# Patient Record
Sex: FEMALE | Race: WHITE | NOT HISPANIC OR LATINO
[De-identification: names, ages, dates, MRNs, and addresses within clinical notes are randomized per-mention and may not be internally consistent; named-entity substitution may affect disease eponyms.]

---

## 2018-12-24 ENCOUNTER — TRANSCRIPTION ENCOUNTER (OUTPATIENT)
Age: 73
End: 2018-12-24

## 2019-10-20 ENCOUNTER — TRANSCRIPTION ENCOUNTER (OUTPATIENT)
Age: 74
End: 2019-10-20

## 2023-04-05 ENCOUNTER — APPOINTMENT (OUTPATIENT)
Dept: ORTHOPEDIC SURGERY | Facility: CLINIC | Age: 78
End: 2023-04-05
Payer: MEDICARE

## 2023-04-05 VITALS — WEIGHT: 180 LBS | BODY MASS INDEX: 35.34 KG/M2 | HEIGHT: 60 IN

## 2023-04-05 DIAGNOSIS — Z78.9 OTHER SPECIFIED HEALTH STATUS: ICD-10-CM

## 2023-04-05 DIAGNOSIS — M54.16 RADICULOPATHY, LUMBAR REGION: ICD-10-CM

## 2023-04-05 DIAGNOSIS — M16.11 UNILATERAL PRIMARY OSTEOARTHRITIS, RIGHT HIP: ICD-10-CM

## 2023-04-05 DIAGNOSIS — Z86.39 PERSONAL HISTORY OF OTHER ENDOCRINE, NUTRITIONAL AND METABOLIC DISEASE: ICD-10-CM

## 2023-04-05 DIAGNOSIS — Z86.79 PERSONAL HISTORY OF OTHER DISEASES OF THE CIRCULATORY SYSTEM: ICD-10-CM

## 2023-04-05 DIAGNOSIS — M35.00 SICCA SYNDROME, UNSPECIFIED: ICD-10-CM

## 2023-04-05 DIAGNOSIS — Z87.19 PERSONAL HISTORY OF OTHER DISEASES OF THE DIGESTIVE SYSTEM: ICD-10-CM

## 2023-04-05 DIAGNOSIS — M19.90 UNSPECIFIED OSTEOARTHRITIS, UNSPECIFIED SITE: ICD-10-CM

## 2023-04-05 DIAGNOSIS — C80.1 MALIGNANT (PRIMARY) NEOPLASM, UNSPECIFIED: ICD-10-CM

## 2023-04-05 PROBLEM — Z00.00 ENCOUNTER FOR PREVENTIVE HEALTH EXAMINATION: Status: ACTIVE | Noted: 2023-04-05

## 2023-04-05 PROCEDURE — 73502 X-RAY EXAM HIP UNI 2-3 VIEWS: CPT

## 2023-04-05 PROCEDURE — 99205 OFFICE O/P NEW HI 60 MIN: CPT

## 2023-04-05 RX ORDER — EZETIMIBE AND SIMVASTATIN 10; 40 MG/1; MG/1
10-40 TABLET ORAL
Refills: 0 | Status: ACTIVE | COMMUNITY

## 2023-04-05 RX ORDER — FERROUS GLUCONATE 324(37.5)
324 (37.5 FE) TABLET ORAL
Refills: 0 | Status: ACTIVE | COMMUNITY

## 2023-04-05 RX ORDER — CALCIUM CITRATE/VITAMIN D3 315MG-6.25
250-62.5 TABLET ORAL
Refills: 0 | Status: ACTIVE | COMMUNITY

## 2023-04-05 RX ORDER — LOSARTAN POTASSIUM 100 MG/1
100 TABLET, FILM COATED ORAL
Refills: 0 | Status: ACTIVE | COMMUNITY

## 2023-04-05 RX ORDER — CHROMIUM 200 MCG
TABLET ORAL
Refills: 0 | Status: ACTIVE | COMMUNITY

## 2023-04-05 RX ORDER — LACTOBACILLUS RHAMNOSUS GG 10B CELL
CAPSULE ORAL
Refills: 0 | Status: ACTIVE | COMMUNITY

## 2023-04-05 RX ORDER — AMLODIPINE BESYLATE 5 MG/1
5 TABLET ORAL
Refills: 0 | Status: ACTIVE | COMMUNITY

## 2023-04-05 RX ORDER — MULTIVIT-MIN/FA/LYCOPEN/LUTEIN .4-300-25
TABLET ORAL
Refills: 0 | Status: ACTIVE | COMMUNITY

## 2023-04-05 RX ORDER — TRIAMTERENE AND HYDROCHLOROTHIAZIDE 25; 37.5 MG/1; MG/1
37.5-25 TABLET ORAL
Refills: 0 | Status: ACTIVE | COMMUNITY

## 2023-04-05 RX ORDER — LEVOTHYROXINE SODIUM 137 UG/1
TABLET ORAL
Refills: 0 | Status: ACTIVE | COMMUNITY

## 2023-04-05 RX ORDER — LEVOTHYROXINE SODIUM 0.17 MG/1
TABLET ORAL
Refills: 0 | Status: ACTIVE | COMMUNITY

## 2023-04-06 ENCOUNTER — RESULT REVIEW (OUTPATIENT)
Age: 78
End: 2023-04-06

## 2023-04-06 NOTE — HISTORY OF PRESENT ILLNESS
[de-identified] : Patient is here today for her Rt hip. States her Rt hip started hurting about 2 weeks ago. States she has groin pain. States she is taking Tylenol, Tizanidine, and prednisone. Patient notes pain mostly Right SI and buttock into the lateral hip and groin. Pain is almost constant. She saw her PMD who sent her for xrays of the L/S spine and given Prednisone which she took with some relief and has 2 days left and Muscle relaxant.  She denies any paresthesias or weakness to the RLE.  \par \par pt sees a rheumatologist for osteoporosis\par \par pt is unable to walk for more than one block\par \par we believe the pt had a thoracic fusion done in ~2004

## 2023-04-06 NOTE — DISCUSSION/SUMMARY
[de-identified] : Options were discussed today including oral anti-inflammatories, Physical Therapy, Steroid Injection, PM or MRI. The patient had time to ask questions of the different treatment options, including doing nothing and just observing for a finite period of time and re-evaluating in the future. \par Suspect pt's sxs are coming from her spine\par MAUREEN not recommended at this time \par Recommend MRI of lumbar spine. pt will f/u with Dr Rivera after MRI for results and further evaluation \par \par Entered by Elsie Coronado acting as scribe. \par Dr. Nelson Attestation\par The documentation recorded by the scribe, in my presence, accurately reflects the service I, Dr. Nelson, personally performed, and the decisions made by me with my edits as appropriate.\par

## 2023-04-06 NOTE — PHYSICAL EXAM
[Outside films reviewed] : Outside films reviewed [Facet arthropathy] : Facet arthropathy [Disc space narrowing] : Disc space narrowing [Spondylolithesis] : Spondylolithesis [Right] : right hip with pelvis [de-identified] : Constitutional: The patient appears well developed, well nourished. Examination of patients ability to communicate functionally was normal.  \par \par  \par \par Neurologic: Coordination is normal. Alert and oriented to time, place and person. No evidence of mood disorder, calm affect.  \par \par  \par \par RIGHT      HIP/THIGH: Inspection of the hip/thigh is as follows: Inspection shows no swelling, no ecchymosis, no erythema, no rashes, no masses and no enlarged lymph nodes.  \par \par  \par \par Palpation of the hip/thigh is as follows:MOST TTP at the Right SI joint/ Lumbar paraspinal musles on the RIGHT NO  greater trochanter tenderness. no groin tenderness.  \par \par NEG SLR TEST\par Q/H/AT/GS/EHL/FHL 5/5 \par Pat reflexes are 2+ b/l\par Ankle reflexes 1 + b/l \par \par  \par \par Range of motion of the hip is as follows in degrees:   \par \par  \par \par Flexion: 110  \par \par Abduction:  30  \par \par External rotation:  40  \par \par Internal rotation:  30   \par \par THERE IS NO SIGNIFICANT PAIN WITH ROM HIP\par  \par \par Strength testing of the hip/thigh is as follows:  \par \par  \par \par Hip flexion strength:  5/5,  \par \par Hip extension strength:  5/5  \par \par Hip abduction strength:   5/5   \par \par Hip adduction strength:   5/5.  \par \par  \par \par Special tests of the hip/thigh is as follows: pain in bursa with internal rotation and pain bursa with external rotation.  \par \par  \par \par Neurological testing of the hip/thigh is as follows: motor exam 5/5 throughout, light touch intact throughout and no focal motor defecits.  \par \par  \par \par Gait and function is as follows:antalgic gait. \par \par   [FreeTextEntry1] : L4-L5 spondylolisthesis grade 1 [FreeTextEntry9] : ap/lat show mild hip DJD and spurring at the greater troch and iliac wing

## 2023-04-20 ENCOUNTER — APPOINTMENT (OUTPATIENT)
Dept: ORTHOPEDIC SURGERY | Facility: CLINIC | Age: 78
End: 2023-04-20
Payer: MEDICARE

## 2023-04-20 VITALS — BODY MASS INDEX: 35.34 KG/M2 | WEIGHT: 180 LBS | HEIGHT: 60 IN

## 2023-04-20 PROCEDURE — 99214 OFFICE O/P EST MOD 30 MIN: CPT

## 2023-04-20 RX ORDER — METHYLPREDNISOLONE 4 MG/1
4 TABLET ORAL
Qty: 1 | Refills: 0 | Status: ACTIVE | COMMUNITY
Start: 2023-04-20 | End: 1900-01-01

## 2023-04-20 NOTE — DATA REVIEWED
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

## 2023-04-20 NOTE — ASSESSMENT
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  Patient advised not to take any NSAIDs while taking the steroids. \par \par Referral to pain management for injections, follow up 2 weeks after injection. \par \par Patient will begin physical therapy. \par \par Recommend: - NSAID - Heating pad - Muscle relaxer - Core strengthening exercise - Hamstring stretching exercise Patient is given back rehabilitation exercise book. \par \par Follow up in 2 months

## 2023-04-20 NOTE — HISTORY OF PRESENT ILLNESS
[Lower back] : lower back [5] : 5 [1] : 2 [Dull/Aching] : dull/aching [Stabbing] : stabbing [Meds] : meds [Retired] : Work status: retired [de-identified] : Patient presents today with lower back pain for a few weeks NKI . PReviously had rib removed and inserted into upper back for vertebrae in [place] with Dr. Eirc Lee ~20 years ago. Saw Dr. Nelson for the right hip, prescribed Acetaminophen-Codeine #3 and was sent for MRI. States she has right sided pain radiating into right hip, denies groin pain. Admits to taking extra strength Tylenol PRN. Had lumbar spine MRI at . [] : no [de-identified] : driving  [de-identified] : lumbar spine MRI at

## 2023-06-29 ENCOUNTER — APPOINTMENT (OUTPATIENT)
Dept: ORTHOPEDIC SURGERY | Facility: CLINIC | Age: 78
End: 2023-06-29
Payer: MEDICARE

## 2023-06-29 VITALS — WEIGHT: 180 LBS | HEIGHT: 60 IN | BODY MASS INDEX: 35.34 KG/M2

## 2023-06-29 DIAGNOSIS — M48.061 SPINAL STENOSIS, LUMBAR REGION WITHOUT NEUROGENIC CLAUDICATION: ICD-10-CM

## 2023-06-29 DIAGNOSIS — M51.37 OTHER INTERVERTEBRAL DISC DEGENERATION, LUMBOSACRAL REGION: ICD-10-CM

## 2023-06-29 DIAGNOSIS — M51.36 OTHER INTERVERTEBRAL DISC DEGENERATION, LUMBAR REGION: ICD-10-CM

## 2023-06-29 DIAGNOSIS — Q76.49 OTHER CONGENITAL MALFORMATIONS OF SPINE, NOT ASSOCIATED WITH SCOLIOSIS: ICD-10-CM

## 2023-06-29 DIAGNOSIS — M51.26 OTHER INTERVERTEBRAL DISC DISPLACEMENT, LUMBAR REGION: ICD-10-CM

## 2023-06-29 DIAGNOSIS — M47.817 SPONDYLOSIS W/OUT MYELOPATHY OR RADICULOPATHY, LUMBOSACRAL REGION: ICD-10-CM

## 2023-06-29 DIAGNOSIS — M54.16 RADICULOPATHY, LUMBAR REGION: ICD-10-CM

## 2023-06-29 DIAGNOSIS — M70.62 TROCHANTERIC BURSITIS, LEFT HIP: ICD-10-CM

## 2023-06-29 PROCEDURE — 99214 OFFICE O/P EST MOD 30 MIN: CPT

## 2023-06-29 NOTE — ASSESSMENT
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 \par \par Follow up PRN

## 2023-06-29 NOTE — HISTORY OF PRESENT ILLNESS
[Lower back] : lower back [5] : 5 [1] : 2 [Dull/Aching] : dull/aching [Stabbing] : stabbing [Meds] : meds [Retired] : Work status: retired [de-identified] : Follow up lumbar spine. States she has intermittent discomfort. Had a right lumbar facet injection with Dr. Parsons ~1month ago with 99% relief. Admits to going to 3 PT sessions and then D/C. Admits to finishing Medrol pack with some relief. Admits to taking extra strength Tylenol PRN.  [] : no [de-identified] : driving  [de-identified] : lumbar spine MRI at

## 2023-08-03 ENCOUNTER — NON-APPOINTMENT (OUTPATIENT)
Age: 78
End: 2023-08-03

## 2023-08-03 ENCOUNTER — APPOINTMENT (OUTPATIENT)
Dept: PULMONOLOGY | Facility: CLINIC | Age: 78
End: 2023-08-03
Payer: MEDICARE

## 2023-08-03 VITALS
WEIGHT: 190 LBS | TEMPERATURE: 96.7 F | HEART RATE: 70 BPM | BODY MASS INDEX: 37.3 KG/M2 | OXYGEN SATURATION: 98 % | HEIGHT: 60 IN | SYSTOLIC BLOOD PRESSURE: 152 MMHG | DIASTOLIC BLOOD PRESSURE: 64 MMHG

## 2023-08-03 DIAGNOSIS — Z86.39 PERSONAL HISTORY OF OTHER ENDOCRINE, NUTRITIONAL AND METABOLIC DISEASE: ICD-10-CM

## 2023-08-03 DIAGNOSIS — Z87.01 PERSONAL HISTORY OF PNEUMONIA (RECURRENT): ICD-10-CM

## 2023-08-03 DIAGNOSIS — Z91.09 OTHER ALLERGY STATUS, OTHER THAN TO DRUGS AND BIOLOGICAL SUBSTANCES: ICD-10-CM

## 2023-08-03 DIAGNOSIS — J30.2 OTHER SEASONAL ALLERGIC RHINITIS: ICD-10-CM

## 2023-08-03 DIAGNOSIS — U07.1 COVID-19: ICD-10-CM

## 2023-08-03 PROCEDURE — 99214 OFFICE O/P EST MOD 30 MIN: CPT

## 2023-08-03 NOTE — HISTORY OF PRESENT ILLNESS
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

## 2023-08-03 NOTE — ASSESSMENT
[FreeTextEntry1] : Ms. Gonzáles presents with chest tightness and shortness of breath.  She comes with an abnormal CAT scan of the chest.  She had a full cardiac work-up which was negative.  Her CAT scan of the chest done on July 11, 2023 reveals mild emphysema changes elevated left diaphragm patchy areas atelectasis right middle right lower lobe and slight scarring and bronchiectasis left base.  I reviewed the CAT scan myself and I think these are all very nonspecific.  She did have radiation to the right breast in 2002 but I do not think that the findings in the right lung are secondary to radiation.  The chest pain/tightness is very nonspecific and unlikely to be pulmonary in origin.  She has been given albuterol and I favor her using the albuterol with the chest tightness and her dyspnea.  A significant opponent of shortness of breath is probably secondary to her weight and she has been instructed weight loss is always beneficial.  We will perform full pulmonary function test on her next visit. The patient understands and agrees with plan of care. Today's office visit encompassed 32 minutes. I conducted an extensive history ,physical exam and reviewed diagnosis and treatment options  including diagnostic tests,radiologic studies including  cat scans  and the use of prescription medication.

## 2023-08-03 NOTE — REASON FOR VISIT
[Initial] : an initial visit [Pneumonia] : pneumonia [Shortness of Breath] : shortness of breath [TextBox_44] : Patient states she had Pneumonia on July 13th she was treated with Azithromycin 250 mg, prednisone 20 mg, Doxycycline 100 mg.  She finished the medication, and started to feel better.  Patient still has lingering SOB.  SOB has been going on for about a year.

## 2023-09-05 ENCOUNTER — APPOINTMENT (OUTPATIENT)
Dept: PULMONOLOGY | Facility: CLINIC | Age: 78
End: 2023-09-05
Payer: MEDICARE

## 2023-09-05 VITALS
TEMPERATURE: 96.9 F | BODY MASS INDEX: 37.3 KG/M2 | HEART RATE: 79 BPM | DIASTOLIC BLOOD PRESSURE: 76 MMHG | SYSTOLIC BLOOD PRESSURE: 148 MMHG | WEIGHT: 190 LBS | HEIGHT: 60 IN | OXYGEN SATURATION: 98 %

## 2023-09-05 DIAGNOSIS — R07.9 CHEST PAIN, UNSPECIFIED: ICD-10-CM

## 2023-09-05 PROCEDURE — 94010 BREATHING CAPACITY TEST: CPT

## 2023-09-05 PROCEDURE — 94727 GAS DIL/WSHOT DETER LNG VOL: CPT

## 2023-09-05 PROCEDURE — 94729 DIFFUSING CAPACITY: CPT

## 2023-09-05 PROCEDURE — 99214 OFFICE O/P EST MOD 30 MIN: CPT | Mod: 25

## 2023-09-05 RX ORDER — ACETAMINOPHEN AND CODEINE 300; 30 MG/1; MG/1
300-30 TABLET ORAL
Qty: 30 | Refills: 0 | Status: DISCONTINUED | COMMUNITY
Start: 2023-04-05 | End: 2023-09-05

## 2023-09-05 NOTE — PHYSICAL EXAM
[No Acute Distress] : no acute distress [Normal Oropharynx] : normal oropharynx [Normal Appearance] : normal appearance [No Neck Mass] : no neck mass [Normal Rate/Rhythm] : normal rate/rhythm [Normal S1, S2] : normal s1, s2 [No Murmurs] : no murmurs [No Resp Distress] : no resp distress [Clear to Auscultation Bilaterally] : clear to auscultation bilaterally [No Abnormalities] : no abnormalities [Benign] : benign [Normal Gait] : normal gait [No Clubbing] : no clubbing [No Cyanosis] : no cyanosis [No Edema] : no edema [FROM] : FROM [Normal Color/ Pigmentation] : normal color/ pigmentation [No Focal Deficits] : no focal deficits [Oriented x3] : oriented x3 [Normal Affect] : normal affect [TextBox_2] : Heavyset female no distress

## 2023-09-05 NOTE — REASON FOR VISIT
[Follow-Up] : a follow-up visit [Shortness of Breath] : shortness of breath [TextBox_44] : 1 month.  Patient states she is still experiencing SOB with exertion.  She is scheduled to have a PFT today.

## 2023-09-05 NOTE — DISCUSSION/SUMMARY
[FreeTextEntry1] : Ms berrios has  thorax pain mainly in the upper chest thorax sternal area with radiation to her jaw.  This is very unusual for a pulmonary process.  She does have an elevated left diaphragm and I have asked her to obtain fluoroscopy of the diaphragm to see if this contributes to any of her dyspnea.  She does have a large hiatal hernia and is possible that this is the cause of some of her discomfort.  I have asked her asked her to follow-up with gastroenterology regarding same.  We will review her fluoroscopy on her next visit. The patient understands and agrees with plan of care. Today's office visit encompassed 32 minutes. I conducted an extensive history ,physical exam and reviewed diagnosis and treatment options  including diagnostic tests,radiologic studies including  cat scans  and the use of prescription medication.

## 2023-09-05 NOTE — HISTORY OF PRESENT ILLNESS
[Never] : never [TextBox_4] : 78 female hx  dyspnea on exertiontoday feels good but  this past month notes tightness in chest upper fields ant and to jaw this happens every few days   no definite ppt factor  not related to exertion  no cough no phlegm no wheeze

## 2023-09-06 ENCOUNTER — NON-APPOINTMENT (OUTPATIENT)
Age: 78
End: 2023-09-06

## 2023-09-07 ENCOUNTER — NON-APPOINTMENT (OUTPATIENT)
Age: 78
End: 2023-09-07

## 2023-09-26 ENCOUNTER — APPOINTMENT (OUTPATIENT)
Dept: PULMONOLOGY | Facility: CLINIC | Age: 78
End: 2023-09-26

## 2023-12-13 ENCOUNTER — OFFICE (OUTPATIENT)
Facility: LOCATION | Age: 78
Setting detail: OPHTHALMOLOGY
End: 2023-12-13
Payer: MEDICARE

## 2023-12-13 ENCOUNTER — RX ONLY (RX ONLY)
Age: 78
End: 2023-12-13

## 2023-12-13 DIAGNOSIS — H43.393: ICD-10-CM

## 2023-12-13 DIAGNOSIS — H00.14: ICD-10-CM

## 2023-12-13 DIAGNOSIS — H01.002: ICD-10-CM

## 2023-12-13 DIAGNOSIS — H01.004: ICD-10-CM

## 2023-12-13 DIAGNOSIS — Z96.1: ICD-10-CM

## 2023-12-13 DIAGNOSIS — H01.001: ICD-10-CM

## 2023-12-13 DIAGNOSIS — H35.033: ICD-10-CM

## 2023-12-13 DIAGNOSIS — H16.223: ICD-10-CM

## 2023-12-13 DIAGNOSIS — H43.813: ICD-10-CM

## 2023-12-13 DIAGNOSIS — Z13.5: ICD-10-CM

## 2023-12-13 DIAGNOSIS — H26.492: ICD-10-CM

## 2023-12-13 DIAGNOSIS — H01.005: ICD-10-CM

## 2023-12-13 PROBLEM — H31.29 PERIPAPILLARY ATROPHY ; BOTH EYES: Status: ACTIVE | Noted: 2023-12-13

## 2023-12-13 PROCEDURE — 92014 COMPRE OPH EXAM EST PT 1/>: CPT | Performed by: OPTOMETRIST

## 2023-12-13 PROCEDURE — 92250 FUNDUS PHOTOGRAPHY W/I&R: CPT | Performed by: OPTOMETRIST

## 2023-12-13 ASSESSMENT — SUPERFICIAL PUNCTATE KERATITIS (SPK)
OS_SPK: 1+
OD_SPK: 1+

## 2023-12-13 ASSESSMENT — LID EXAM ASSESSMENTS
OD_BLEPHARITIS: RLL RUL
OS_BLEPHARITIS: LLL LUL

## 2023-12-13 ASSESSMENT — CONFRONTATIONAL VISUAL FIELD TEST (CVF)
OD_FINDINGS: FULL
OS_FINDINGS: FULL

## 2023-12-14 ASSESSMENT — REFRACTION_AUTOREFRACTION
OD_SPHERE: -0.25
OS_SPHERE: -0.75
OS_AXIS: 174
OD_AXIS: 001
OS_CYLINDER: +0.75
OD_CYLINDER: +0.25

## 2023-12-14 ASSESSMENT — REFRACTION_CURRENTRX
OD_CYLINDER: +0.75
OS_SPHERE: +2.25
OD_AXIS: 004
OS_OVR_VA: 20/
OD_SPHERE: +2.50
OD_OVR_VA: 20/

## 2023-12-14 ASSESSMENT — SPHEQUIV_DERIVED
OD_SPHEQUIV: -0.125
OS_SPHEQUIV: -0.375

## 2024-03-21 ENCOUNTER — APPOINTMENT (OUTPATIENT)
Dept: PULMONOLOGY | Facility: CLINIC | Age: 79
End: 2024-03-21
Payer: MEDICARE

## 2024-03-21 VITALS
SYSTOLIC BLOOD PRESSURE: 138 MMHG | OXYGEN SATURATION: 97 % | TEMPERATURE: 97.6 F | DIASTOLIC BLOOD PRESSURE: 70 MMHG | WEIGHT: 193 LBS | HEART RATE: 81 BPM | HEIGHT: 60 IN | BODY MASS INDEX: 37.89 KG/M2

## 2024-03-21 DIAGNOSIS — J98.6 DISORDERS OF DIAPHRAGM: ICD-10-CM

## 2024-03-21 DIAGNOSIS — R06.09 OTHER FORMS OF DYSPNEA: ICD-10-CM

## 2024-03-21 PROCEDURE — 99214 OFFICE O/P EST MOD 30 MIN: CPT

## 2024-03-21 RX ORDER — TIZANIDINE 2 MG/1
2 TABLET ORAL 3 TIMES DAILY
Qty: 60 | Refills: 0 | Status: DISCONTINUED | COMMUNITY
Start: 2023-04-20 | End: 2024-03-21

## 2024-03-21 RX ORDER — METOPROLOL TARTRATE 25 MG/1
25 TABLET, FILM COATED ORAL
Refills: 0 | Status: ACTIVE | COMMUNITY

## 2024-03-21 NOTE — HISTORY OF PRESENT ILLNESS
[Never] : never [TextBox_4] : 78 female hx  dyspnea on exertion seen by Tova and discussed HH repair seen by cardiology Yen  and cardiac status stable  but might need valve repair in several years [place] airport  no cough no phlegm no wheeze  no fever no chest pain no weight loss

## 2024-03-21 NOTE — REASON FOR VISIT
[Follow-Up] : a follow-up visit [Shortness of Breath] : shortness of breath [TextEntry] : Patient states she is still experiencing SOB with minimal exertion.

## 2024-03-21 NOTE — DISCUSSION/SUMMARY
[FreeTextEntry1] : Ms. Gonzáles has dyspnea on exertion.  There is a concern that the hiatal hernia is causing significant pulmonary compromise.  I reviewed her recent CAT scan once again on all low there is a large hiatal hernia does not seem to be impinging the left thorax greatly.  She does have an elevated left diaphragm which could more likely be a cause.  Fluoroscopy showed normal movement.  I did discuss diaphragmatic plication with the patient although there is no guarantees this would afford a significant improvement.  The patient has a BMI of 38 and certainly weight loss would be the most important thing she could do to help her overall health and exercise status.  She has a history of mild COPD.  We will try Spiriva 1.25 mg 2 sprays daily to see if it affords any relief. The patient understands and agrees with plan of care. Today's office visit encompassed 32 minutes. I conducted an extensive history, physical exam and reviewed diagnosis and treatment options including diagnostic tests,radiology studies including cat scans and the use of prescription medication.

## 2024-03-21 NOTE — PHYSICAL EXAM
[No Acute Distress] : no acute distress [Normal Oropharynx] : normal oropharynx [Normal Appearance] : normal appearance [No Neck Mass] : no neck mass [Normal Rate/Rhythm] : normal rate/rhythm [Normal S1, S2] : normal s1, s2 [No Murmurs] : no murmurs [No Resp Distress] : no resp distress [Clear to Auscultation Bilaterally] : clear to auscultation bilaterally [No Abnormalities] : no abnormalities [Benign] : benign [Normal Gait] : normal gait [No Clubbing] : no clubbing [No Cyanosis] : no cyanosis [No Edema] : no edema [FROM] : FROM [Normal Color/ Pigmentation] : normal color/ pigmentation [No Focal Deficits] : no focal deficits [Oriented x3] : oriented x3 [TextBox_2] : Obese female no respiratory distress [Normal Affect] : normal affect

## 2024-04-22 ENCOUNTER — APPOINTMENT (OUTPATIENT)
Dept: PULMONOLOGY | Facility: CLINIC | Age: 79
End: 2024-04-22
Payer: MEDICARE

## 2024-05-10 ENCOUNTER — APPOINTMENT (OUTPATIENT)
Dept: PULMONOLOGY | Facility: CLINIC | Age: 79
End: 2024-05-10
Payer: MEDICARE

## 2024-05-10 VITALS
HEIGHT: 60 IN | OXYGEN SATURATION: 99 % | BODY MASS INDEX: 38.09 KG/M2 | WEIGHT: 194 LBS | TEMPERATURE: 97.4 F | HEART RATE: 76 BPM | SYSTOLIC BLOOD PRESSURE: 128 MMHG | DIASTOLIC BLOOD PRESSURE: 86 MMHG

## 2024-05-10 DIAGNOSIS — J45.30 MILD PERSISTENT ASTHMA, UNCOMPLICATED: ICD-10-CM

## 2024-05-10 PROCEDURE — 99214 OFFICE O/P EST MOD 30 MIN: CPT

## 2024-05-10 RX ORDER — TIOTROPIUM BROMIDE INHALATION SPRAY 1.56 UG/1
1.25 SPRAY, METERED RESPIRATORY (INHALATION) DAILY
Qty: 1 | Refills: 6 | Status: ACTIVE | COMMUNITY
Start: 2024-05-10 | End: 1900-01-01

## 2024-05-10 NOTE — DISCUSSION/SUMMARY
[FreeTextEntry1] : Ms. Gonzáles has multiple reasons for dyspnea on exertion.  She does have a large hiatal hernia and underlying cardiac valvular disease.  She also is obese.  She does note an improvement with the Spiriva.  This will cause some bronchodilation and since she is feeling better on the medicine I will continue same.  We will perform full pulmonary function test on her next visit. The patient understands and agrees with plan of care. Today's office visit encompassed 32 minutes. I conducted an extensive history, physical exam and reviewed diagnosis and treatment options including diagnostic tests,radiology studies including cat scans and the use of prescription medication.

## 2024-05-10 NOTE — PHYSICAL EXAM
[No Acute Distress] : no acute distress [Normal Oropharynx] : normal oropharynx [Normal Appearance] : normal appearance [No Neck Mass] : no neck mass [Normal Rate/Rhythm] : normal rate/rhythm [Normal S1, S2] : normal s1, s2 [No Murmurs] : no murmurs [No Resp Distress] : no resp distress [Clear to Auscultation Bilaterally] : clear to auscultation bilaterally [No Abnormalities] : no abnormalities [Benign] : benign [Normal Gait] : normal gait [No Clubbing] : no clubbing [No Cyanosis] : no cyanosis [No Edema] : no edema [FROM] : FROM [Normal Color/ Pigmentation] : normal color/ pigmentation [No Focal Deficits] : no focal deficits [Oriented x3] : oriented x3 [Normal Affect] : normal affect [TextBox_2] : Heavyset female no respiratory discomfort

## 2024-05-10 NOTE — REASON FOR VISIT
[Follow-Up] : a follow-up visit [TextEntry] : 1 month. Patient states she feeling better from last month.

## 2024-05-10 NOTE — HISTORY OF PRESENT ILLNESS
[Never] : never [TextBox_4] : 79 female no hx tobacco  + dyspnea on exertion  started on spiriva and feel better  improved exercise tolerance no chest pain no tightness no wheeze no wt loss

## 2024-06-06 ENCOUNTER — RX ONLY (RX ONLY)
Age: 79
End: 2024-06-06

## 2024-06-06 ENCOUNTER — OFFICE (OUTPATIENT)
Facility: LOCATION | Age: 79
Setting detail: OPHTHALMOLOGY
End: 2024-06-06
Payer: MEDICARE

## 2024-06-06 DIAGNOSIS — H43.393: ICD-10-CM

## 2024-06-06 DIAGNOSIS — H35.033: ICD-10-CM

## 2024-06-06 DIAGNOSIS — H01.001: ICD-10-CM

## 2024-06-06 DIAGNOSIS — H00.011: ICD-10-CM

## 2024-06-06 DIAGNOSIS — H31.29: ICD-10-CM

## 2024-06-06 DIAGNOSIS — Z13.5: ICD-10-CM

## 2024-06-06 DIAGNOSIS — H01.005: ICD-10-CM

## 2024-06-06 DIAGNOSIS — H01.002: ICD-10-CM

## 2024-06-06 DIAGNOSIS — H16.223: ICD-10-CM

## 2024-06-06 DIAGNOSIS — H26.492: ICD-10-CM

## 2024-06-06 DIAGNOSIS — H01.004: ICD-10-CM

## 2024-06-06 DIAGNOSIS — H43.813: ICD-10-CM

## 2024-06-06 PROBLEM — H00.14 CHALAZION; LEFT UPPER LID ,: Status: RESOLVED | Noted: 2024-06-06 | Resolved: 2024-06-06

## 2024-06-06 PROCEDURE — 92250 FUNDUS PHOTOGRAPHY W/I&R: CPT | Performed by: OPTOMETRIST

## 2024-06-06 PROCEDURE — 92014 COMPRE OPH EXAM EST PT 1/>: CPT | Performed by: OPTOMETRIST

## 2024-06-06 ASSESSMENT — CONFRONTATIONAL VISUAL FIELD TEST (CVF)
OD_FINDINGS: FULL
OS_FINDINGS: FULL

## 2024-06-06 ASSESSMENT — LID EXAM ASSESSMENTS
OS_BLEPHARITIS: LLL LUL
OD_BLEPHARITIS: RLL RUL

## 2024-06-20 ENCOUNTER — OFFICE (OUTPATIENT)
Facility: LOCATION | Age: 79
Setting detail: OPHTHALMOLOGY
End: 2024-06-20
Payer: MEDICARE

## 2024-06-20 ENCOUNTER — RX ONLY (RX ONLY)
Age: 79
End: 2024-06-20

## 2024-06-20 DIAGNOSIS — H00.011: ICD-10-CM

## 2024-06-20 DIAGNOSIS — H01.002: ICD-10-CM

## 2024-06-20 DIAGNOSIS — H01.005: ICD-10-CM

## 2024-06-20 DIAGNOSIS — H01.004: ICD-10-CM

## 2024-06-20 DIAGNOSIS — H01.001: ICD-10-CM

## 2024-06-20 PROCEDURE — 99213 OFFICE O/P EST LOW 20 MIN: CPT | Performed by: OPTOMETRIST

## 2024-06-20 ASSESSMENT — LID EXAM ASSESSMENTS
OS_BLEPHARITIS: LLL LUL
OD_BLEPHARITIS: RLL RUL

## 2024-06-20 ASSESSMENT — CONFRONTATIONAL VISUAL FIELD TEST (CVF)
OS_FINDINGS: FULL
OD_FINDINGS: FULL

## 2024-08-06 ENCOUNTER — APPOINTMENT (OUTPATIENT)
Dept: PULMONOLOGY | Facility: CLINIC | Age: 79
End: 2024-08-06

## 2024-08-06 PROCEDURE — 94729 DIFFUSING CAPACITY: CPT

## 2024-08-06 PROCEDURE — 94010 BREATHING CAPACITY TEST: CPT

## 2024-08-06 PROCEDURE — 99214 OFFICE O/P EST MOD 30 MIN: CPT | Mod: 25

## 2024-08-06 PROCEDURE — 94727 GAS DIL/WSHOT DETER LNG VOL: CPT

## 2024-08-06 PROCEDURE — ZZZZZ: CPT

## 2024-08-06 NOTE — PROCEDURE
[FreeTextEntry1] : Pulmonary function test 8/6/2020 for mild restrictive disease.  Severe decreased diffusion capacity possible interstitial disease.

## 2024-08-06 NOTE — HISTORY OF PRESENT ILLNESS
[Never] : never [TextBox_4] : 79 female hx mild aod  today feels good  no sob no cough no wheeze no chest pain no tightness +seasonal allergies  flonase nasal spray full activity

## 2024-08-06 NOTE — DISCUSSION/SUMMARY
[FreeTextEntry1] : Ms. Gonzáles has mild intermittent asthma.  She does not use the Spiriva regularly but on an as-needed basis.  I told her this is not the proper way to use the medication and we will use albuterol 2 sprays 4 times a day as needed.  Pulmonary function test show mild restrictive disease with possible interstitial component.  Her CAT scan shows significant elevated left diaphragm which is normally functioning.  She also has a large hiatal hernia.  This probably accounts for the low volumes and possibly for the diffusion defect.  No further workup is needed at this time The patient understands and agrees with plan of care. Today's office visit encompassed 32 minutes. I conducted an extensive history, physical exam and reviewed diagnosis and treatment options including diagnostic tests,radiology studies including cat scans and the use of prescription medication.

## 2024-08-06 NOTE — REASON FOR VISIT
[Follow-Up] : a follow-up visit [Asthma] : asthma [Shortness of Breath] : shortness of breath [TextEntry] : Patient states she has SOB with exertion.  The weather affects her breathing.  Patient had a PFT.

## 2024-12-23 ENCOUNTER — OFFICE (OUTPATIENT)
Facility: LOCATION | Age: 79
Setting detail: OPHTHALMOLOGY
End: 2024-12-23
Payer: MEDICARE

## 2024-12-23 DIAGNOSIS — H35.033: ICD-10-CM

## 2024-12-23 DIAGNOSIS — Z13.5: ICD-10-CM

## 2024-12-23 DIAGNOSIS — H00.011: ICD-10-CM

## 2024-12-23 DIAGNOSIS — H16.223: ICD-10-CM

## 2024-12-23 PROBLEM — H01.005 BLEPHARITIS; RIGHT UPPER LID, RIGHT LOWER LID, LEFT UPPER LID, LEFT LOWER LID: Status: ACTIVE | Noted: 2024-12-23

## 2024-12-23 PROBLEM — H01.001 BLEPHARITIS; RIGHT UPPER LID, RIGHT LOWER LID, LEFT UPPER LID, LEFT LOWER LID: Status: ACTIVE | Noted: 2024-12-23

## 2024-12-23 PROBLEM — H01.002 BLEPHARITIS; RIGHT UPPER LID, RIGHT LOWER LID, LEFT UPPER LID, LEFT LOWER LID: Status: ACTIVE | Noted: 2024-12-23

## 2024-12-23 PROBLEM — H01.004 BLEPHARITIS; RIGHT UPPER LID, RIGHT LOWER LID, LEFT UPPER LID, LEFT LOWER LID: Status: ACTIVE | Noted: 2024-12-23

## 2024-12-23 PROCEDURE — 92250 FUNDUS PHOTOGRAPHY W/I&R: CPT | Performed by: OPTOMETRIST

## 2024-12-23 PROCEDURE — 92014 COMPRE OPH EXAM EST PT 1/>: CPT | Performed by: OPTOMETRIST

## 2024-12-23 ASSESSMENT — LID EXAM ASSESSMENTS
OS_BLEPHARITIS: LLL LUL
OD_BLEPHARITIS: RLL RUL

## 2024-12-23 ASSESSMENT — CONFRONTATIONAL VISUAL FIELD TEST (CVF)
OS_FINDINGS: FULL
OD_FINDINGS: FULL

## 2024-12-23 ASSESSMENT — SUPERFICIAL PUNCTATE KERATITIS (SPK)
OS_SPK: 1+
OD_SPK: 1+

## 2024-12-23 ASSESSMENT — TONOMETRY
OD_IOP_MMHG: 15
OS_IOP_MMHG: 15

## 2024-12-26 ASSESSMENT — REFRACTION_CURRENTRX
OS_OVR_VA: 20/
OD_AXIS: 004
OD_SPHERE: +2.50
OD_OVR_VA: 20/
OS_SPHERE: +2.25
OD_CYLINDER: +0.75

## 2024-12-26 ASSESSMENT — REFRACTION_AUTOREFRACTION
OD_AXIS: 010
OS_SPHERE: -0.50
OS_CYLINDER: +1.00
OD_SPHERE: -0.25
OD_CYLINDER: +0.75
OS_AXIS: 004

## 2024-12-26 ASSESSMENT — KERATOMETRY
OS_K1POWER_DIOPTERS: 41.00
OS_AXISANGLE_DEGREES: 108
OS_K2POWER_DIOPTERS: 42.75
OD_AXISANGLE_DEGREES: 180
OD_K1POWER_DIOPTERS: 44.00
OD_K2POWER_DIOPTERS: 44.75

## 2024-12-26 ASSESSMENT — VISUAL ACUITY
OD_BCVA: 20/30+2
OS_BCVA: 20/25-2

## 2025-01-30 ENCOUNTER — APPOINTMENT (OUTPATIENT)
Dept: PULMONOLOGY | Facility: CLINIC | Age: 80
End: 2025-01-30
Payer: MEDICARE

## 2025-01-30 VITALS
SYSTOLIC BLOOD PRESSURE: 128 MMHG | OXYGEN SATURATION: 96 % | DIASTOLIC BLOOD PRESSURE: 68 MMHG | TEMPERATURE: 98 F | BODY MASS INDEX: 37.89 KG/M2 | HEART RATE: 68 BPM | WEIGHT: 193 LBS | HEIGHT: 60 IN

## 2025-01-30 PROCEDURE — 99214 OFFICE O/P EST MOD 30 MIN: CPT

## 2025-01-30 RX ORDER — LEVOTHYROXINE SODIUM 88 UG/1
88 TABLET ORAL
Refills: 0 | Status: ACTIVE | COMMUNITY

## 2025-03-05 ENCOUNTER — OFFICE (OUTPATIENT)
Facility: LOCATION | Age: 80
Setting detail: OPHTHALMOLOGY
End: 2025-03-05
Payer: MEDICARE

## 2025-03-05 DIAGNOSIS — H01.004: ICD-10-CM

## 2025-03-05 DIAGNOSIS — H01.001: ICD-10-CM

## 2025-03-05 DIAGNOSIS — H00.021: ICD-10-CM

## 2025-03-05 DIAGNOSIS — H00.015: ICD-10-CM

## 2025-03-05 DIAGNOSIS — H01.005: ICD-10-CM

## 2025-03-05 DIAGNOSIS — H01.002: ICD-10-CM

## 2025-03-05 PROCEDURE — 99213 OFFICE O/P EST LOW 20 MIN: CPT | Performed by: OPTOMETRIST

## 2025-03-05 ASSESSMENT — LID EXAM ASSESSMENTS
OD_BLEPHARITIS: RLL RUL
OS_BLEPHARITIS: LLL LUL

## 2025-03-05 ASSESSMENT — TONOMETRY
OS_IOP_MMHG: 15
OD_IOP_MMHG: 15

## 2025-03-05 ASSESSMENT — CONFRONTATIONAL VISUAL FIELD TEST (CVF)
OS_FINDINGS: FULL
OD_FINDINGS: FULL

## 2025-03-06 ASSESSMENT — REFRACTION_AUTOREFRACTION
OS_CYLINDER: +1.00
OS_SPHERE: -0.50
OD_AXIS: 010
OD_SPHERE: -0.25
OD_CYLINDER: +0.75
OS_AXIS: 004

## 2025-03-06 ASSESSMENT — KERATOMETRY
OS_K1POWER_DIOPTERS: 41.00
OD_K2POWER_DIOPTERS: 44.75
OS_K2POWER_DIOPTERS: 42.75
OD_K1POWER_DIOPTERS: 44.00
OD_AXISANGLE_DEGREES: 180
OS_AXISANGLE_DEGREES: 108

## 2025-03-06 ASSESSMENT — REFRACTION_CURRENTRX
OS_OVR_VA: 20/
OD_AXIS: 004
OD_CYLINDER: +0.75
OS_SPHERE: +2.25
OD_OVR_VA: 20/
OD_SPHERE: +2.50

## 2025-03-06 ASSESSMENT — VISUAL ACUITY
OD_BCVA: 20/20-1
OS_BCVA: 20/20-1

## 2025-03-19 ENCOUNTER — RX ONLY (RX ONLY)
Age: 80
End: 2025-03-19

## 2025-03-19 ENCOUNTER — OFFICE (OUTPATIENT)
Facility: LOCATION | Age: 80
Setting detail: OPHTHALMOLOGY
End: 2025-03-19
Payer: MEDICARE

## 2025-03-19 DIAGNOSIS — H00.021: ICD-10-CM

## 2025-03-19 DIAGNOSIS — H00.015: ICD-10-CM

## 2025-03-19 PROBLEM — H01.002 BLEPHARITIS; RIGHT UPPER LID, RIGHT LOWER LID, LEFT UPPER LID, LEFT LOWER LID: Status: ACTIVE | Noted: 2025-03-19

## 2025-03-19 PROBLEM — H01.004 BLEPHARITIS; RIGHT UPPER LID, RIGHT LOWER LID, LEFT UPPER LID, LEFT LOWER LID: Status: ACTIVE | Noted: 2025-03-19

## 2025-03-19 PROBLEM — H01.001 BLEPHARITIS; RIGHT UPPER LID, RIGHT LOWER LID, LEFT UPPER LID, LEFT LOWER LID: Status: ACTIVE | Noted: 2025-03-19

## 2025-03-19 PROBLEM — H01.005 BLEPHARITIS; RIGHT UPPER LID, RIGHT LOWER LID, LEFT UPPER LID, LEFT LOWER LID: Status: ACTIVE | Noted: 2025-03-19

## 2025-03-19 PROCEDURE — 99213 OFFICE O/P EST LOW 20 MIN: CPT | Performed by: OPTOMETRIST

## 2025-03-19 ASSESSMENT — CONFRONTATIONAL VISUAL FIELD TEST (CVF)
OS_FINDINGS: FULL
OD_FINDINGS: FULL

## 2025-03-19 ASSESSMENT — TONOMETRY
OS_IOP_MMHG: 17
OD_IOP_MMHG: 17

## 2025-03-19 ASSESSMENT — SUPERFICIAL PUNCTATE KERATITIS (SPK)
OS_SPK: 1+
OD_SPK: 1+

## 2025-03-19 ASSESSMENT — LID EXAM ASSESSMENTS
OS_BLEPHARITIS: LLL LUL
OD_BLEPHARITIS: RLL RUL

## 2025-03-20 ASSESSMENT — KERATOMETRY
OD_K1POWER_DIOPTERS: 44.00
OS_K2POWER_DIOPTERS: 42.75
OD_AXISANGLE_DEGREES: 180
OS_K1POWER_DIOPTERS: 41.00
OD_K2POWER_DIOPTERS: 44.75
OS_AXISANGLE_DEGREES: 108

## 2025-03-20 ASSESSMENT — VISUAL ACUITY
OD_BCVA: 20/30+1
OS_BCVA: 20/30+2

## 2025-03-20 ASSESSMENT — REFRACTION_AUTOREFRACTION
OD_CYLINDER: +0.75
OD_AXIS: 010
OS_SPHERE: -0.50
OS_AXIS: 004
OS_CYLINDER: +1.00
OD_SPHERE: -0.25

## 2025-03-20 ASSESSMENT — REFRACTION_CURRENTRX
OD_CYLINDER: +0.75
OS_OVR_VA: 20/
OD_AXIS: 004
OD_OVR_VA: 20/
OS_SPHERE: +2.25
OD_SPHERE: +2.50

## 2025-03-28 ENCOUNTER — NON-APPOINTMENT (OUTPATIENT)
Age: 80
End: 2025-03-28

## 2025-06-02 ENCOUNTER — RX RENEWAL (OUTPATIENT)
Age: 80
End: 2025-06-02

## 2025-07-02 ENCOUNTER — OFFICE (OUTPATIENT)
Facility: LOCATION | Age: 80
Setting detail: OPHTHALMOLOGY
End: 2025-07-02
Payer: MEDICARE

## 2025-07-02 DIAGNOSIS — H26.492: ICD-10-CM

## 2025-07-02 DIAGNOSIS — H16.223: ICD-10-CM

## 2025-07-02 DIAGNOSIS — H43.393: ICD-10-CM

## 2025-07-02 DIAGNOSIS — H35.033: ICD-10-CM

## 2025-07-02 DIAGNOSIS — H43.813: ICD-10-CM

## 2025-07-02 PROBLEM — H01.004 BLEPHARITIS; RIGHT UPPER LID, RIGHT LOWER LID, LEFT UPPER LID, LEFT LOWER LID: Status: ACTIVE | Noted: 2025-07-02

## 2025-07-02 PROBLEM — H01.005 BLEPHARITIS; RIGHT UPPER LID, RIGHT LOWER LID, LEFT UPPER LID, LEFT LOWER LID: Status: ACTIVE | Noted: 2025-07-02

## 2025-07-02 PROBLEM — H01.002 BLEPHARITIS; RIGHT UPPER LID, RIGHT LOWER LID, LEFT UPPER LID, LEFT LOWER LID: Status: ACTIVE | Noted: 2025-07-02

## 2025-07-02 PROBLEM — H01.001 BLEPHARITIS; RIGHT UPPER LID, RIGHT LOWER LID, LEFT UPPER LID, LEFT LOWER LID: Status: ACTIVE | Noted: 2025-07-02

## 2025-07-02 PROCEDURE — 92250 FUNDUS PHOTOGRAPHY W/I&R: CPT | Performed by: OPTOMETRIST

## 2025-07-02 PROCEDURE — 92014 COMPRE OPH EXAM EST PT 1/>: CPT | Performed by: OPTOMETRIST

## 2025-07-02 ASSESSMENT — VISUAL ACUITY
OD_BCVA: 20/25
OS_BCVA: 20/25-1

## 2025-07-02 ASSESSMENT — REFRACTION_AUTOREFRACTION
OD_CYLINDER: +0.50
OD_SPHERE: -0.25
OD_AXIS: 165
OS_AXIS: 006
OS_CYLINDER: +0.75
OS_SPHERE: -0.75

## 2025-07-02 ASSESSMENT — LID EXAM ASSESSMENTS
OS_BLEPHARITIS: LLL LUL
OD_BLEPHARITIS: RLL RUL

## 2025-07-02 ASSESSMENT — CONFRONTATIONAL VISUAL FIELD TEST (CVF)
OD_FINDINGS: FULL
OS_FINDINGS: FULL

## 2025-07-02 ASSESSMENT — KERATOMETRY
OD_K1POWER_DIOPTERS: 44.00
OD_AXISANGLE_DEGREES: 180
OD_K2POWER_DIOPTERS: 44.75
OS_K1POWER_DIOPTERS: 41.00
OS_K2POWER_DIOPTERS: 42.75
OS_AXISANGLE_DEGREES: 108

## 2025-07-02 ASSESSMENT — REFRACTION_CURRENTRX
OD_SPHERE: +2.50
OS_SPHERE: +2.25
OD_OVR_VA: 20/
OD_CYLINDER: +0.75
OS_OVR_VA: 20/
OD_AXIS: 004

## 2025-07-02 ASSESSMENT — SUPERFICIAL PUNCTATE KERATITIS (SPK)
OS_SPK: 1+
OD_SPK: 1+

## 2025-07-02 ASSESSMENT — TONOMETRY
OS_IOP_MMHG: 12
OD_IOP_MMHG: 13

## 2025-07-08 ENCOUNTER — APPOINTMENT (OUTPATIENT)
Dept: PULMONOLOGY | Facility: CLINIC | Age: 80
End: 2025-07-08
Payer: MEDICARE

## 2025-07-08 VITALS
WEIGHT: 191 LBS | DIASTOLIC BLOOD PRESSURE: 68 MMHG | HEART RATE: 71 BPM | SYSTOLIC BLOOD PRESSURE: 116 MMHG | HEIGHT: 60 IN | BODY MASS INDEX: 37.5 KG/M2 | OXYGEN SATURATION: 97 % | TEMPERATURE: 97.8 F

## 2025-07-08 PROCEDURE — 94727 GAS DIL/WSHOT DETER LNG VOL: CPT

## 2025-07-08 PROCEDURE — 94729 DIFFUSING CAPACITY: CPT

## 2025-07-08 PROCEDURE — 94010 BREATHING CAPACITY TEST: CPT

## 2025-07-08 PROCEDURE — 99214 OFFICE O/P EST MOD 30 MIN: CPT | Mod: 25

## 2025-07-08 RX ORDER — SEMAGLUTIDE 0.25 MG/.5ML
0.25 INJECTION, SOLUTION SUBCUTANEOUS
Refills: 0 | Status: ACTIVE | COMMUNITY